# Patient Record
Sex: MALE | Race: WHITE
[De-identification: names, ages, dates, MRNs, and addresses within clinical notes are randomized per-mention and may not be internally consistent; named-entity substitution may affect disease eponyms.]

---

## 2020-01-06 ENCOUNTER — HOSPITAL ENCOUNTER (OUTPATIENT)
Dept: HOSPITAL 25 - OR | Age: 35
Discharge: HOME | End: 2020-01-06
Attending: SURGERY
Payer: SELF-PAY

## 2020-01-06 VITALS — SYSTOLIC BLOOD PRESSURE: 139 MMHG | DIASTOLIC BLOOD PRESSURE: 65 MMHG

## 2020-01-06 DIAGNOSIS — Z87.891: ICD-10-CM

## 2020-01-06 DIAGNOSIS — K40.20: Primary | ICD-10-CM

## 2020-01-06 PROCEDURE — 49650 LAP ING HERNIA REPAIR INIT: CPT

## 2020-01-06 PROCEDURE — C1781 MESH (IMPLANTABLE): HCPCS

## 2020-01-06 RX ADMIN — FENTANYL CITRATE PRN MCG: 0.05 INJECTION, SOLUTION INTRAMUSCULAR; INTRAVENOUS at 17:40

## 2020-01-06 RX ADMIN — FENTANYL CITRATE PRN MCG: 0.05 INJECTION, SOLUTION INTRAMUSCULAR; INTRAVENOUS at 18:11

## 2020-01-06 RX ADMIN — OXYCODONE HYDROCHLORIDE AND ACETAMINOPHEN PRN TAB: 5; 325 TABLET ORAL at 17:39

## 2020-01-06 RX ADMIN — OXYCODONE HYDROCHLORIDE AND ACETAMINOPHEN PRN TAB: 5; 325 TABLET ORAL at 18:14

## 2020-01-07 NOTE — OP
DATE OF OPERATION:  01/06/20 - [facility]

 

DATE OF BIRTH:  [birth year]

 

SURGEON:  Chuy Dugan MD.

 

FIRST ASSISTANT:  TARUN Ramsay.

 

PRE-OP DIAGNOSIS:  Right inguinal hernia.

 

POST-OP DIAGNOSIS:  Bilateral inguinal hernia.

 

OPERATIVE PROCEDURE:  Robotic repair of bilateral inguinal hernia with mesh.

 

INDICATIONS FOR PROCEDURE:  Right inguinal hernia.  Risks of surgery included, 
but not limited to bleeding, infection, injury to the bowels or other intra-
abdominal contents, pelvic nerves and vessels, recurrence of the hernia were 
explained to the patient, who seemed to understand and agreed to the procedure 
and all questions were answered.  He understood that the potential for 
bilateral inguinal hernia repair for a small hernia, so clinical was noted.

 

DESCRIPTION OF PROCEDURE:  The patient was taken to the operating room and 
placed supine.  Preoperative antibiotics were given.  After the successful 
induction of general endotracheal anesthesia, the abdomen was prepped and 
draped in sterile fashion.  A left upper quadrant trocar was placed.  Under 
direct visualization of the camera, using a bladeless Optiview trocar, a 
pneumoperitoneum was achieved with 12 mmHg. A camera was placed in the abdomen. 
The abdomen was scanned.  There was no obvious injury from trocar placement.  8 
mm trocars were placed in the upper midline and right upper quadrant 
respectively and a 5 mm Optiview trocar was placed through an 8 mm robotic 
trocar.  The patient was placed in a slight Trendelenburg position.  The 
abdomen was scanned with the camera.  Small left inguinal hernia was noted with 
the moderate-to-large/right inguinal hernia.  No other abnormalities were 
noted.  A right and left sided mesh were placed in the abdomen along with two 3-
0 V-Loc sutures.  The right side was approached initially.  Peritoneum was 
taken down and a large hernia sac was gently dissected free from the cord.  The 
right-sided mesh was then placed over the pelvis covering the direct and 
indirect femoral spaces.  The peritoneum was closed with a running V-Loc 
suture.  The left side was approached, peritoneum was opened, mesh was placed.  
After the small hernia sac was gently dissected free from this cord strictures, 
which were identified and avoided. Again, the mesh was placed over the pelvis.  
This was a left-sided ProGrip mesh, similarly the right-sided ProGrip mesh was 
used was used on the right side.  The peritoneum was closed with a running 3-0 V
-Loc suture.  The abdomen was scanned. The omentum was gently placed over the 
lower bowels in the region of the pelvis. Pneumoperitoneum was released from 
the abdomen.  The trocars were removed.  The skin was closed with Monocryl and 
glued at each site.  The patient tolerated the procedure well, was extubated 
and taken to Recovery in stable condition.

 

 [id number]/CPS #: [id number]

MTDD